# Patient Record
Sex: FEMALE | Race: WHITE | Employment: UNEMPLOYED | URBAN - METROPOLITAN AREA
[De-identification: names, ages, dates, MRNs, and addresses within clinical notes are randomized per-mention and may not be internally consistent; named-entity substitution may affect disease eponyms.]

---

## 2023-05-24 ENCOUNTER — HOSPITAL ENCOUNTER (OUTPATIENT)
Age: 33
Discharge: HOME | End: 2023-05-24
Payer: MEDICAID

## 2023-05-24 VITALS
TEMPERATURE: 98 F | SYSTOLIC BLOOD PRESSURE: 116 MMHG | OXYGEN SATURATION: 99 % | DIASTOLIC BLOOD PRESSURE: 51 MMHG | RESPIRATION RATE: 18 BRPM | HEART RATE: 89 BPM

## 2023-05-24 DIAGNOSIS — J02.0 STREPTOCOCCAL SORE THROAT: Primary | ICD-10-CM

## 2023-05-24 LAB — S PYO AG THROAT QL: POSITIVE

## 2023-05-24 PROCEDURE — 87880 STREP A ASSAY W/OPTIC: CPT | Performed by: NURSE PRACTITIONER

## 2023-05-24 PROCEDURE — 99203 OFFICE O/P NEW LOW 30 MIN: CPT | Performed by: NURSE PRACTITIONER

## 2023-05-24 RX ORDER — DEXAMETHASONE SODIUM PHOSPHATE 10 MG/ML
10 INJECTION, SOLUTION INTRAMUSCULAR; INTRAVENOUS ONCE
Status: COMPLETED | OUTPATIENT
Start: 2023-05-24 | End: 2023-05-24

## 2023-05-24 RX ORDER — PENICILLIN V POTASSIUM 500 MG/1
500 TABLET ORAL 2 TIMES DAILY
Qty: 20 TABLET | Refills: 0 | Status: SHIPPED | OUTPATIENT
Start: 2023-05-24 | End: 2023-06-03

## 2024-02-04 ENCOUNTER — HOSPITAL ENCOUNTER (EMERGENCY)
Facility: HOSPITAL | Age: 34
Discharge: HOME | End: 2024-02-04
Attending: STUDENT IN AN ORGANIZED HEALTH CARE EDUCATION/TRAINING PROGRAM
Payer: MEDICAID

## 2024-02-04 VITALS
SYSTOLIC BLOOD PRESSURE: 135 MMHG | HEIGHT: 64 IN | RESPIRATION RATE: 18 BRPM | DIASTOLIC BLOOD PRESSURE: 59 MMHG | TEMPERATURE: 98 F | HEART RATE: 109 BPM | OXYGEN SATURATION: 100 % | WEIGHT: 211 LBS | BODY MASS INDEX: 36.02 KG/M2

## 2024-02-04 DIAGNOSIS — S39.012A STRAIN OF LUMBAR REGION, INITIAL ENCOUNTER: Primary | ICD-10-CM

## 2024-02-04 LAB — B-HCG UR QL: NEGATIVE

## 2024-02-04 PROCEDURE — 99284 EMERGENCY DEPT VISIT MOD MDM: CPT

## 2024-02-04 PROCEDURE — 81025 URINE PREGNANCY TEST: CPT

## 2024-02-04 PROCEDURE — 99283 EMERGENCY DEPT VISIT LOW MDM: CPT

## 2024-02-04 PROCEDURE — 96372 THER/PROPH/DIAG INJ SC/IM: CPT

## 2024-02-04 RX ORDER — CYCLOBENZAPRINE HCL 10 MG
10 TABLET ORAL 3 TIMES DAILY PRN
Qty: 20 TABLET | Refills: 0 | Status: SHIPPED | OUTPATIENT
Start: 2024-02-04 | End: 2024-02-11

## 2024-02-04 RX ORDER — KETOROLAC TROMETHAMINE 30 MG/ML
30 INJECTION, SOLUTION INTRAMUSCULAR; INTRAVENOUS ONCE
Status: COMPLETED | OUTPATIENT
Start: 2024-02-04 | End: 2024-02-04

## 2024-02-04 RX ORDER — IBUPROFEN 600 MG/1
600 TABLET ORAL EVERY 8 HOURS PRN
Qty: 30 TABLET | Refills: 0 | Status: SHIPPED | OUTPATIENT
Start: 2024-02-04 | End: 2024-02-11

## 2024-02-05 NOTE — ED INITIAL ASSESSMENT (HPI)
Pt presents to the ED with c/o lower back pain. Pt reports turning and taking a step when she suddenly had sharp pain in her lower back around 1pm.

## 2024-02-05 NOTE — ED PROVIDER NOTES
History     Chief Complaint   Patient presents with    Back Pain       HPI    33 year old female presents with low back pain.  Earlier today patient turned and twisted her low back, felt sudden onset pain in her low back worse in certain positions or with movement.  She has been trying to massage the area.  No weakness or paresthesias, no bowel or bladder changes, no fevers or direct trauma.  Denies a prior history of cancer, tuberculosis, IV drug use.          History reviewed. No pertinent past medical history.    No past surgical history on file.    No pertinent social history.                 Physical Exam     ED Triage Vitals [02/04/24 1844]   /59   Pulse 109   Resp 18   Temp 97.8 °F (36.6 °C)   Temp src Oral   SpO2 100 %   O2 Device None (Room air)       Physical Exam  Constitutional:       General: She is not in acute distress.  Eyes:      Extraocular Movements: Extraocular movements intact.   Cardiovascular:      Rate and Rhythm: Normal rate.      Pulses: Normal pulses.   Pulmonary:      Effort: Pulmonary effort is normal. No respiratory distress.   Musculoskeletal:      Cervical back: Normal range of motion.      Comments: no midline C/T/L TTP  + There is tenderness and spasm along the bilateral paralumbar and upper sacral region  no deformities  SLR neg  patellar reflexes are equal and normal  BLE strength 5/5, ranging legs easily, sensation equal  no saddle anesthesia  BLE warm to touch with normal cap refill       Neurological:      General: No focal deficit present.      Mental Status: She is alert.              ED Course     Labs Reviewed   POCT PREGNANCY URINE - Normal     No results found.        [facility]     Vitals:    02/04/24 1844   BP: 135/59   Pulse: 109   Resp: 18   Temp: 97.8 °F (36.6 °C)   TempSrc: Oral   SpO2: 100%   Weight: 95.7 kg   Height: 162.6 cm (5' 4\")       Low back sprain, spasm likely.  No midline tenderness to suggest fracture.  Neurovascularly intact distally.  No signs of  cord compression.    Discussed and on range of motion exercises, heating packs, NSAIDs, muscle relaxers prescription given.    Discussed all findings.  Patient is feeling well to be discharged.  Vitals remain stable.  Ambulating without difficulty.  Given written and verbal instructions regarding this condition and strict return precautions.   Will follow up in clinic.   Patient verbalizes understanding of instructions and follow up plan, as well as indications to return to the emergency department.             Disposition and Plan     Clinical Impression:  1. Strain of lumbar region, initial encounter        Disposition:  Discharge    Follow-up:  pcp    Follow up in 3 day(s)        Medications Prescribed:  Discharge Medication List as of 2/4/2024  7:26 PM        START taking these medications    Details   ibuprofen 600 MG Oral Tab Take 1 tablet (600 mg total) by mouth every 8 (eight) hours as needed for Pain or Fever., Normal, Disp-30 tablet, R-0      cyclobenzaprine 10 MG Oral Tab Take 1 tablet (10 mg total) by mouth 3 (three) times daily as needed for Muscle spasms., Normal, Disp-20 tablet, R-0

## (undated) NOTE — LETTER
Date & Time: 5/24/2023, 10:14 AM  Patient: Fermin Benitez  Encounter Provider(s):    DEBBIE Collins       To Whom It May Concern:    Fermin Benitez was seen and treated in our department on 5/24/2023. She should not return to work until 5/26/2023 .     If you have any questions or concerns, please do not hesitate to call.        _____________________________  Physician/[facility] Signature